# Patient Record
Sex: MALE | Race: WHITE | NOT HISPANIC OR LATINO | URBAN - METROPOLITAN AREA
[De-identification: names, ages, dates, MRNs, and addresses within clinical notes are randomized per-mention and may not be internally consistent; named-entity substitution may affect disease eponyms.]

---

## 2023-02-17 ENCOUNTER — HOSPITAL ENCOUNTER (INPATIENT)
Facility: HOSPITAL | Age: 73
LOS: 2 days | Discharge: HOME | DRG: 605 | End: 2023-02-21
Attending: EMERGENCY MEDICINE | Admitting: SURGERY
Payer: MEDICARE

## 2023-02-17 DIAGNOSIS — S81.811A LACERATION OF RIGHT LOWER EXTREMITY, INITIAL ENCOUNTER: Primary | ICD-10-CM

## 2023-02-17 PROCEDURE — 99285 PR EMERGENCY DEPT VISIT,LEVEL V: ICD-10-PCS | Mod: ,,, | Performed by: EMERGENCY MEDICINE

## 2023-02-17 PROCEDURE — 96374 THER/PROPH/DIAG INJ IV PUSH: CPT

## 2023-02-17 PROCEDURE — 99285 EMERGENCY DEPT VISIT HI MDM: CPT | Mod: 25

## 2023-02-17 PROCEDURE — 99285 EMERGENCY DEPT VISIT HI MDM: CPT | Mod: ,,, | Performed by: EMERGENCY MEDICINE

## 2023-02-17 RX ORDER — MORPHINE SULFATE 4 MG/ML
4 INJECTION, SOLUTION INTRAMUSCULAR; INTRAVENOUS
Status: COMPLETED | OUTPATIENT
Start: 2023-02-18 | End: 2023-02-17

## 2023-02-17 RX ADMIN — MORPHINE SULFATE 4 MG: 4 INJECTION INTRAVENOUS at 11:02

## 2023-02-18 PROBLEM — S71.112A LACERATION OF LEFT THIGH: Status: ACTIVE | Noted: 2023-02-18

## 2023-02-18 PROBLEM — S81.811A LACERATION OF RIGHT LOWER EXTREMITY: Status: ACTIVE | Noted: 2023-02-18

## 2023-02-18 PROBLEM — S71.111A LACERATION OF RIGHT THIGH: Status: ACTIVE | Noted: 2023-02-18

## 2023-02-18 LAB
ANION GAP SERPL CALC-SCNC: 9 MMOL/L (ref 8–16)
BASOPHILS # BLD AUTO: 0.03 K/UL (ref 0–0.2)
BASOPHILS NFR BLD: 0.5 % (ref 0–1.9)
BUN SERPL-MCNC: 16 MG/DL (ref 8–23)
CALCIUM SERPL-MCNC: 9 MG/DL (ref 8.7–10.5)
CHLORIDE SERPL-SCNC: 107 MMOL/L (ref 95–110)
CO2 SERPL-SCNC: 24 MMOL/L (ref 23–29)
CREAT SERPL-MCNC: 0.8 MG/DL (ref 0.5–1.4)
DIFFERENTIAL METHOD: ABNORMAL
EOSINOPHIL # BLD AUTO: 0.3 K/UL (ref 0–0.5)
EOSINOPHIL NFR BLD: 4 % (ref 0–8)
ERYTHROCYTE [DISTWIDTH] IN BLOOD BY AUTOMATED COUNT: 13.2 % (ref 11.5–14.5)
EST. GFR  (NO RACE VARIABLE): >60 ML/MIN/1.73 M^2
GLUCOSE SERPL-MCNC: 84 MG/DL (ref 70–110)
HCT VFR BLD AUTO: 41.1 % (ref 40–54)
HGB BLD-MCNC: 13 G/DL (ref 14–18)
IMM GRANULOCYTES # BLD AUTO: 0.01 K/UL (ref 0–0.04)
IMM GRANULOCYTES NFR BLD AUTO: 0.2 % (ref 0–0.5)
LYMPHOCYTES # BLD AUTO: 0.7 K/UL (ref 1–4.8)
LYMPHOCYTES NFR BLD: 11.2 % (ref 18–48)
MCH RBC QN AUTO: 28.6 PG (ref 27–31)
MCHC RBC AUTO-ENTMCNC: 31.6 G/DL (ref 32–36)
MCV RBC AUTO: 91 FL (ref 82–98)
MONOCYTES # BLD AUTO: 0.7 K/UL (ref 0.3–1)
MONOCYTES NFR BLD: 10.1 % (ref 4–15)
NEUTROPHILS # BLD AUTO: 4.8 K/UL (ref 1.8–7.7)
NEUTROPHILS NFR BLD: 74 % (ref 38–73)
NRBC BLD-RTO: 0 /100 WBC
PLATELET # BLD AUTO: 265 K/UL (ref 150–450)
PMV BLD AUTO: 9 FL (ref 9.2–12.9)
POTASSIUM SERPL-SCNC: 4.4 MMOL/L (ref 3.5–5.1)
RBC # BLD AUTO: 4.54 M/UL (ref 4.6–6.2)
SODIUM SERPL-SCNC: 140 MMOL/L (ref 136–145)
WBC # BLD AUTO: 6.45 K/UL (ref 3.9–12.7)

## 2023-02-18 PROCEDURE — 99223 1ST HOSP IP/OBS HIGH 75: CPT | Mod: ,,, | Performed by: ORTHOPAEDIC SURGERY

## 2023-02-18 PROCEDURE — 63600175 PHARM REV CODE 636 W HCPCS: Performed by: STUDENT IN AN ORGANIZED HEALTH CARE EDUCATION/TRAINING PROGRAM

## 2023-02-18 PROCEDURE — 25000003 PHARM REV CODE 250: Performed by: SURGERY

## 2023-02-18 PROCEDURE — 80048 BASIC METABOLIC PNL TOTAL CA: CPT | Performed by: STUDENT IN AN ORGANIZED HEALTH CARE EDUCATION/TRAINING PROGRAM

## 2023-02-18 PROCEDURE — 99223 PR INITIAL HOSPITAL CARE,LEVL III: ICD-10-PCS | Mod: ,,, | Performed by: ORTHOPAEDIC SURGERY

## 2023-02-18 PROCEDURE — 63600175 PHARM REV CODE 636 W HCPCS: Performed by: SURGERY

## 2023-02-18 PROCEDURE — 25000003 PHARM REV CODE 250: Performed by: STUDENT IN AN ORGANIZED HEALTH CARE EDUCATION/TRAINING PROGRAM

## 2023-02-18 PROCEDURE — 85025 COMPLETE CBC W/AUTO DIFF WBC: CPT | Performed by: STUDENT IN AN ORGANIZED HEALTH CARE EDUCATION/TRAINING PROGRAM

## 2023-02-18 PROCEDURE — G0378 HOSPITAL OBSERVATION PER HR: HCPCS

## 2023-02-18 PROCEDURE — 15851 PR REM SUTURES W ANESTH OTHR SURGEON: ICD-10-PCS | Mod: ,,, | Performed by: SURGERY

## 2023-02-18 PROCEDURE — 36415 COLL VENOUS BLD VENIPUNCTURE: CPT | Performed by: STUDENT IN AN ORGANIZED HEALTH CARE EDUCATION/TRAINING PROGRAM

## 2023-02-18 PROCEDURE — 96372 THER/PROPH/DIAG INJ SC/IM: CPT | Performed by: STUDENT IN AN ORGANIZED HEALTH CARE EDUCATION/TRAINING PROGRAM

## 2023-02-18 PROCEDURE — 63600175 PHARM REV CODE 636 W HCPCS

## 2023-02-18 PROCEDURE — 15851 REMOVAL SUTR/STAPLE REQ ANES: CPT | Mod: ,,, | Performed by: SURGERY

## 2023-02-18 PROCEDURE — 94761 N-INVAS EAR/PLS OXIMETRY MLT: CPT

## 2023-02-18 RX ORDER — ACETAMINOPHEN 325 MG/1
650 TABLET ORAL EVERY 8 HOURS PRN
Status: DISCONTINUED | OUTPATIENT
Start: 2023-02-18 | End: 2023-02-19

## 2023-02-18 RX ORDER — TALC
6 POWDER (GRAM) TOPICAL NIGHTLY PRN
Status: DISCONTINUED | OUTPATIENT
Start: 2023-02-18 | End: 2023-02-21 | Stop reason: HOSPADM

## 2023-02-18 RX ORDER — SODIUM CHLORIDE 0.9 % (FLUSH) 0.9 %
10 SYRINGE (ML) INJECTION
Status: DISCONTINUED | OUTPATIENT
Start: 2023-02-18 | End: 2023-02-21 | Stop reason: HOSPADM

## 2023-02-18 RX ORDER — HYDROMORPHONE HYDROCHLORIDE 1 MG/ML
0.2 INJECTION, SOLUTION INTRAMUSCULAR; INTRAVENOUS; SUBCUTANEOUS ONCE
Status: COMPLETED | OUTPATIENT
Start: 2023-02-18 | End: 2023-02-18

## 2023-02-18 RX ORDER — VANCOMYCIN HCL IN 5 % DEXTROSE 1.25 G/25
1250 PLASTIC BAG, INJECTION (ML) INTRAVENOUS
Status: DISCONTINUED | OUTPATIENT
Start: 2023-02-18 | End: 2023-02-19

## 2023-02-18 RX ORDER — ONDANSETRON 8 MG/1
8 TABLET, ORALLY DISINTEGRATING ORAL EVERY 8 HOURS PRN
Status: DISCONTINUED | OUTPATIENT
Start: 2023-02-18 | End: 2023-02-21 | Stop reason: HOSPADM

## 2023-02-18 RX ORDER — ENOXAPARIN SODIUM 100 MG/ML
40 INJECTION SUBCUTANEOUS EVERY 24 HOURS
Status: DISCONTINUED | OUTPATIENT
Start: 2023-02-18 | End: 2023-02-21 | Stop reason: HOSPADM

## 2023-02-18 RX ORDER — SODIUM CHLORIDE, SODIUM LACTATE, POTASSIUM CHLORIDE, CALCIUM CHLORIDE 600; 310; 30; 20 MG/100ML; MG/100ML; MG/100ML; MG/100ML
INJECTION, SOLUTION INTRAVENOUS CONTINUOUS
Status: DISCONTINUED | OUTPATIENT
Start: 2023-02-18 | End: 2023-02-18

## 2023-02-18 RX ADMIN — PIPERACILLIN SODIUM AND TAZOBACTAM SODIUM 4.5 G: 4; .5 INJECTION, POWDER, LYOPHILIZED, FOR SOLUTION INTRAVENOUS at 02:02

## 2023-02-18 RX ADMIN — PIPERACILLIN SODIUM AND TAZOBACTAM SODIUM 4.5 G: 4; .5 INJECTION, POWDER, LYOPHILIZED, FOR SOLUTION INTRAVENOUS at 09:02

## 2023-02-18 RX ADMIN — HYDROMORPHONE HYDROCHLORIDE 0.2 MG: 1 INJECTION, SOLUTION INTRAMUSCULAR; INTRAVENOUS; SUBCUTANEOUS at 11:02

## 2023-02-18 RX ADMIN — PIPERACILLIN SODIUM AND TAZOBACTAM SODIUM 4.5 G: 4; .5 INJECTION, POWDER, LYOPHILIZED, FOR SOLUTION INTRAVENOUS at 05:02

## 2023-02-18 RX ADMIN — ENOXAPARIN SODIUM 40 MG: 40 INJECTION SUBCUTANEOUS at 05:02

## 2023-02-18 RX ADMIN — VANCOMYCIN HYDROCHLORIDE 1250 MG: 5 INJECTION, POWDER, LYOPHILIZED, FOR SOLUTION INTRAVENOUS at 01:02

## 2023-02-18 RX ADMIN — VANCOMYCIN HYDROCHLORIDE 1500 MG: 1.5 INJECTION, POWDER, LYOPHILIZED, FOR SOLUTION INTRAVENOUS at 03:02

## 2023-02-18 RX ADMIN — SODIUM CHLORIDE, POTASSIUM CHLORIDE, SODIUM LACTATE AND CALCIUM CHLORIDE: 600; 310; 30; 20 INJECTION, SOLUTION INTRAVENOUS at 05:02

## 2023-02-18 NOTE — ED PROVIDER NOTES
"Encounter Date: 2/17/2023       History     Chief Complaint   Patient presents with    Transfer     From [facility] for surg eval of leg injury - pt cut leg with saw yesterday and attempted to suture/glue it himself     72-year-old male presenting is transfer from [place] for surgical evaluation of right lower extremity laceration that occurred over 24 hours ago.  Patient states that his shoulder "gave out" and he lost control of a circular saw which caused a deep 20 cm laceration along his right distal thigh.  Patient attempted to suture the laceration himself with a needle and thread as well as superglue. He reports some numbness for a few centimeters distal to the laceration but reports intact motor and sensation in the reminder of the lower leg. At OSH, he received Tdap and Ancef.  X-ray showed no fracture or osseous abnormality.  CTA of the thigh showed no contrast extravasation.  Patient reports pain is controlled at present.     The history is provided by medical records and the patient. No  was used.   Review of patient's allergies indicates:  No Known Allergies  History reviewed. No pertinent past medical history.  History reviewed. No pertinent surgical history.  History reviewed. No pertinent family history.  Social History     Tobacco Use    Smoking status: Unknown     Review of Systems   Constitutional:  Negative for chills and fever.   HENT:  Negative for congestion and sore throat.    Eyes:  Negative for pain and discharge.   Respiratory:  Negative for shortness of breath and wheezing.    Cardiovascular:  Negative for chest pain and palpitations.   Gastrointestinal:  Negative for abdominal pain, nausea and vomiting.   Genitourinary:  Negative for difficulty urinating and dysuria.   Musculoskeletal:  Negative for back pain and joint swelling.   Skin:  Positive for wound. Negative for color change and rash.   Neurological:  Positive for numbness (At distal right thigh). Negative " for weakness.   Hematological:  Does not bruise/bleed easily.     Physical Exam     Initial Vitals [02/17/23 2305]   BP Pulse Resp Temp SpO2   124/74 78 16 97.6 °F (36.4 °C) 97 %      MAP       --         Physical Exam    Nursing note and vitals reviewed.  Constitutional: He is not diaphoretic. No distress.   HENT:   Head: Normocephalic and atraumatic.   Mouth/Throat: Oropharynx is clear and moist.   Eyes: Conjunctivae and EOM are normal.   Neck: Neck supple.   Normal range of motion.  Cardiovascular:  Normal rate, regular rhythm, normal heart sounds and intact distal pulses.           Pulmonary/Chest: Breath sounds normal. He has no wheezes. He has no rhonchi. He has no rales.   Abdominal: Abdomen is soft. He exhibits no distension. There is no abdominal tenderness.   Musculoskeletal:         General: Normal range of motion.      Cervical back: Normal range of motion and neck supple.     Neurological: He is alert and oriented to person, place, and time. He has normal strength. A sensory deficit (Numbness for 3 cm distal to laceration) is present.   Skin: Skin is warm and dry. Capillary refill takes less than 2 seconds.   20cm semi-circumferential laceration to right anterior distal thigh with visible suture as well as surrounding erythema and induration. Normal ROM of knee and hip.  Palpable right DP pulse.             ED Course   Procedures  Labs Reviewed - No data to display         Imaging Results    None          Medications   sodium chloride 0.9% flush 10 mL (has no administration in time range)   ondansetron disintegrating tablet 8 mg (has no administration in time range)   melatonin tablet 6 mg (has no administration in time range)   acetaminophen tablet 650 mg (has no administration in time range)   vancomycin - pharmacy to dose (has no administration in time range)   piperacillin-tazobactam (ZOSYN) 4.5 g in dextrose 5 % in water (D5W) 5 % 100 mL IVPB (MB+) (4.5 g Intravenous New Bag 2/18/23 3488)    vancomycin (VANCOCIN) 1250 mg in 5 % dextrose 250 mL IVPB (has no administration in time range)   lactated ringers infusion ( Intravenous New Bag 2/18/23 0576)   enoxaparin injection 40 mg (has no administration in time range)   morphine injection 4 mg (4 mg Intravenous Given 2/17/23 3983)   vancomycin 1,500 mg in dextrose 5 % (D5W) 250 mL IVPB (Vial-Mate) (0 mg Intravenous Stopped 2/18/23 7627)     Medical Decision Making:   History:   Old Medical Records: I decided to obtain old medical records.  Old Records Summarized: records from another hospital.  Differential Diagnosis:   Including, but not limited to:  Laceration   Cellulitis  Abscess  Joint space infection  ED Management:  Patient is hemodynamically stable.  Morphine given for pain.  Orthopedic surgery consulted and determined based on the CT scan that there was no joint involvement.  General surgery consulted and admitted patient with plan to take him to the operating room for incision and debridement.  Other:   I have discussed this case with another health care provider.          Attending Attestation:   Physician Attestation Statement for Resident:  As the supervising MD   Physician Attestation Statement: I have personally seen and examined this patient.   I agree with the above history.  -: 72-year-old male transferred from outside hospital for evaluation of leg wound.  Tetanus updated at outside hospital, patient received Ancef.  Labs reviewed from outside hospital, as well as CT imaging (superficial fraying of the muscle fibers of the vastus lateralis group is suspected, quadriceps musculotendinous junction may also demonstrates some involvement with superficial fraying but grossly appears to be aligned/intact).   Denies other injury during the incident.   As the supervising MD I agree with the above PE.   -: Right lower extremity wound with surrounding erythema  Intact distal pulse, sensation intact to light touch (reports some decrease just  distal to wound), compartments soft  ROM right knee limited 2/2 pain   RRR  No distress  Lungs clear   No e/o head wound, non-tender spine, chest, abdomen    As the supervising MD I agree with the above treatment, course, plan, and disposition.   -: Appreciate General surgery evaluation of the patient.    Also discussed case with Orthopedics, no concern for joint involvement.  Patient admitted to general surgery service for further management, plan for likely OR washout.   Patient understands and agrees with plan.   Would trend CPK once admitted (abnormal at OSH).    I have reviewed and agree with the residents interpretation of the following: lab data.  I have reviewed the following: old records at this facility.                           Clinical Impression:   Final diagnoses:  [S8.392E] Laceration of right lower extremity, initial encounter (Primary)        ED Disposition Condition    Observation                 Indira Pereyra MD  Resident  02/18/23 0660       Devin Bullock MD  02/18/23 5180

## 2023-02-18 NOTE — SUBJECTIVE & OBJECTIVE
History reviewed. No pertinent past medical history.    History reviewed. No pertinent surgical history.    Review of patient's allergies indicates:  No Known Allergies    No current facility-administered medications for this encounter.     No current outpatient medications on file.     Family History    None       Tobacco Use    Smoking status: Unknown    Smokeless tobacco: Not on file   Substance and Sexual Activity    Alcohol use: Not on file    Drug use: Not on file    Sexual activity: Not on file     ROS  Constitutional: negative for fevers  Eyes: negative visual changes  ENT: negative for hearing loss  Respiratory: negative for dyspnea  Cardiovascular: negative for chest pain  Gastrointestinal: negative for abdominal pain  Genitourinary: negative for dysuria  Neurological: negative for headaches  Behavioral/Psych: negative for hallucinations  Endocrine: negative for temperature intolerance    Objective:     Vital Signs (Most Recent):  Temp: 97.6 °F (36.4 °C) (02/17/23 2305)  Pulse: 78 (02/17/23 2305)  Resp: 16 (02/17/23 2356)  BP: 124/74 (02/17/23 2305)  SpO2: 97 % (02/17/23 2305) Vital Signs (24h Range):  Temp:  [97.6 °F (36.4 °C)-99.4 °F (37.4 °C)] 97.6 °F (36.4 °C)  Pulse:  [78-96] 78  Resp:  [16-24] 16  SpO2:  [97 %-100 %] 97 %  BP: (124-179)/(63-93) 124/74           There is no height or weight on file to calculate BMI.    No intake or output data in the 24 hours ending 02/18/23 0052    Ortho/SPM Exam  General:  no acute distress, appears stated age   Neuro: alert and oriented x3  Psych: normal mood  Head: normocephalic, atraumatic.  Eyes: no scleral icterus  Mouth: moist mucous membranes  Cardiovascular: extremities warm and well perfused  Lungs: breathing comfortably, equal chest rise bilat  Skin: clean, dry, intact (any exceptions noted in below musculoskeletal exam)    MSK:  RUE:  - Skin intact throughout, no open wounds  - No swelling  - No ecchymosis, erythema, or signs of cellulitis  - NonTTP  throughout  - AROM and PROM of the shoulder, elbow, wrist, and hand intact without pain  - Axillary/AIN/PIN/Radial/Median/Ulnar Nerves assessed in isolation without deficit  - SILT throughout  - Compartments soft  - Radial artery palpated   - Capillary Refill <3s    LUE:  - Skin intact throughout, no open wounds  - No swelling  - No ecchymosis, erythema, or signs of cellulitis  - NonTTP throughout  - AROM and PROM of the shoulder, elbow, wrist, and hand intact without pain  - Axillary/AIN/PIN/Radial/Median/Ulnar Nerves assessed in isolation without deficit  - SILT throughout  - Compartments soft  - Radial artery palpated   - Capillary Refill <3s    RLE:  - Large laceration over distal femur with retained glue/thread  - Able to ambulate  - TTP over knee  - Pain with knee ROM is limited to quad pain, no knee joint pain  - TA/EHL/Gastroc/FHL assessed in isolation without deficit  - SILT except several cm area distal surrounding laceration  - Compartments soft  - DP and PT palpated  - Capillary Refill <3s      LLE:  - Skin intact throughout, no open wounds  - No swelling  - No ecchymosis, erythema, or signs of cellulitis  - NonTTP throughout  - AROM and PROM of the hip, knee, ankle, and foot intact without pain  - TA/EHL/Gastroc/FHL assessed in isolation without deficit  - SILT throughout  - Compartments soft  - DP and PT palpated  - Capillary Refill <3s      Spine/pelvis/axial body:  No tenderness to palpation of cervical, thoracic, or lumbar spine  No pain with compression of pelvis  No chest wall or abdominal tenderness        Significant Labs: All pertinent labs within the past 24 hours have been reviewed.    Significant Imaging: I have reviewed and interpreted all pertinent imaging results/findings.  CTA showing air within anterior soft tissues of right thigh, superficial to quad/vastus musculature, small amount of superficial muscle involvement. No air near knee joint, no joint effusion.

## 2023-02-18 NOTE — HPI
"72-year-old male presenting is transfer from [place] for surgical evaluation of right lower extremity laceration that occurred over 24 hours ago.  Patient states that his shoulder "gave out" and he lost control of a circular saw which caused a deep 20 cm laceration along his right distal thigh.  Patient attempted to suture the laceration himself with a needle and thread as well as superglue. He reports some numbness for a few centimeters distal to the laceration but reports intact motor and sensation in the reminder of the lower leg. At OSH, he received Tdap and Ancef.  X-ray showed no fracture or osseous abnormality.  CTA of the thigh showed no contrast extravasation.  Patient reports pain is controlled at present. He has been ambulatory.  "

## 2023-02-18 NOTE — ASSESSMENT & PLAN NOTE
72-year-old male presenting with 20 cm long right lower extremity laceration, majority superficial to quadriceps/vastus musculature. There is no involvement of the knee joint on CT scan, and the knee joint itself is painless on exam. Intact extensor mechanism. Agree with Tdap and ancef. No other orthopedic issues noted. Remainder of evaluation and management per general surgery.

## 2023-02-18 NOTE — CONSULTS
"[facility] - [facility] Dept  Orthopedics  Consult Note    Patient Name: Collins Ivy  MRN: [id number]  Admission Date: 2/17/2023  Hospital Length of Stay: 0 days  Attending Provider: Santiago Cuello MD  Primary Care Provider: Primary Doctor No      Inpatient consult to Orthopedic Surgery  Consult performed by: Nikolas Borrego MD  Consult ordered by: Devin Bullock MD        Subjective:     Principal Problem:Laceration of right thigh    Chief Complaint:   Chief Complaint   Patient presents with    Transfer     From [facility] for surg eval of leg injury - pt cut leg with saw yesterday and attempted to suture/glue it himself        HPI: 72-year-old male presenting is transfer from [place] for surgical evaluation of right lower extremity laceration that occurred over 24 hours ago.  Patient states that his shoulder "gave out" and he lost control of a circular saw which caused a deep 20 cm laceration along his right distal thigh.  Patient attempted to suture the laceration himself with a needle and thread as well as superglue. He reports some numbness for a few centimeters distal to the laceration but reports intact motor and sensation in the reminder of the lower leg. At OSH, he received Tdap and Ancef.  X-ray showed no fracture or osseous abnormality.  CTA of the thigh showed no contrast extravasation.  Patient reports pain is controlled at present. He has been ambulatory.      History reviewed. No pertinent past medical history.    History reviewed. No pertinent surgical history.    Review of patient's allergies indicates:  No Known Allergies    No current facility-administered medications for this encounter.     No current outpatient medications on file.     Family History    None       Tobacco Use    Smoking status: Unknown    Smokeless tobacco: Not on file   Substance and Sexual Activity    Alcohol use: Not on file    Drug use: Not on file    Sexual activity: Not on file     ROS  Constitutional: " negative for fevers  Eyes: negative visual changes  ENT: negative for hearing loss  Respiratory: negative for dyspnea  Cardiovascular: negative for chest pain  Gastrointestinal: negative for abdominal pain  Genitourinary: negative for dysuria  Neurological: negative for headaches  Behavioral/Psych: negative for hallucinations  Endocrine: negative for temperature intolerance    Objective:     Vital Signs (Most Recent):  Temp: 97.6 °F (36.4 °C) (02/17/23 2305)  Pulse: 78 (02/17/23 2305)  Resp: 16 (02/17/23 2356)  BP: 124/74 (02/17/23 2305)  SpO2: 97 % (02/17/23 2305) Vital Signs (24h Range):  Temp:  [97.6 °F (36.4 °C)-99.4 °F (37.4 °C)] 97.6 °F (36.4 °C)  Pulse:  [78-96] 78  Resp:  [16-24] 16  SpO2:  [97 %-100 %] 97 %  BP: (124-179)/(63-93) 124/74           There is no height or weight on file to calculate BMI.    No intake or output data in the 24 hours ending 02/18/23 0052    Ortho/SPM Exam  General:  no acute distress, appears stated age   Neuro: alert and oriented x3  Psych: normal mood  Head: normocephalic, atraumatic.  Eyes: no scleral icterus  Mouth: moist mucous membranes  Cardiovascular: extremities warm and well perfused  Lungs: breathing comfortably, equal chest rise bilat  Skin: clean, dry, intact (any exceptions noted in below musculoskeletal exam)    MSK:  RUE:  - Skin intact throughout, no open wounds  - No swelling  - No ecchymosis, erythema, or signs of cellulitis  - NonTTP throughout  - AROM and PROM of the shoulder, elbow, wrist, and hand intact without pain  - Axillary/AIN/PIN/Radial/Median/Ulnar Nerves assessed in isolation without deficit  - SILT throughout  - Compartments soft  - Radial artery palpated   - Capillary Refill <3s    LUE:  - Skin intact throughout, no open wounds  - No swelling  - No ecchymosis, erythema, or signs of cellulitis  - NonTTP throughout  - AROM and PROM of the shoulder, elbow, wrist, and hand intact without pain  - Axillary/AIN/PIN/Radial/Median/Ulnar Nerves assessed in  isolation without deficit  - SILT throughout  - Compartments soft  - Radial artery palpated   - Capillary Refill <3s    RLE:  - Large laceration over distal femur with retained glue/thread  - Able to ambulate  - TTP over knee  - Pain with knee ROM is limited to quad pain, no knee joint pain  - TA/EHL/Gastroc/FHL assessed in isolation without deficit  - SILT except several cm area distal surrounding laceration  - Compartments soft  - DP and PT palpated  - Capillary Refill <3s      LLE:  - Skin intact throughout, no open wounds  - No swelling  - No ecchymosis, erythema, or signs of cellulitis  - NonTTP throughout  - AROM and PROM of the hip, knee, ankle, and foot intact without pain  - TA/EHL/Gastroc/FHL assessed in isolation without deficit  - SILT throughout  - Compartments soft  - DP and PT palpated  - Capillary Refill <3s      Spine/pelvis/axial body:  No tenderness to palpation of cervical, thoracic, or lumbar spine  No pain with compression of pelvis  No chest wall or abdominal tenderness        Significant Labs: All pertinent labs within the past 24 hours have been reviewed.    Significant Imaging: I have reviewed and interpreted all pertinent imaging results/findings.  CTA showing air within anterior soft tissues of right thigh, superficial to quad/vastus musculature, small amount of superficial muscle involvement. No air near knee joint, no joint effusion.    Assessment/Plan:     * Laceration of right thigh  72-year-old male presenting with 20 cm long right lower extremity laceration, majority superficial to quadriceps/vastus musculature. There is no involvement of the knee joint on CT scan, and the knee joint itself is painless on exam. Intact extensor mechanism. Received Tdap and ancef, vanc ordered. No other orthopedic issues noted. Remainder of evaluation and management per general surgery.          Nikolas Borrego MD  Orthopedics  [facility] Dept

## 2023-02-18 NOTE — CONSULTS
[facility]  General Surgery  Consult Note    Inpatient consult to General Surgery  Consult performed by: Jb Stanley MD  Consult ordered by: Indira Pereyra MD      Subjective:     Chief Complaint/Reason for Admission:   Laceration     History of Present Illness:   71 yo male presenting with laceration to the right thigh that occurred yesterday. Reports that he was using a circular saw and accidentally cut himself. He stitched closed with polyester sutured from [employer] and then applied superglue to stop the bleeding. This afternoon he had increased pain and muscle spasm so he consulted to the ED.     At the ED a XR and a CTA were done. There was no evidence of active contrast extravasation or large focal hematoma within the soft tissues involved with a superficial laceration at the level of the distal 1/3rd of the diaphysis of the femur just above the knee midline extending laterally.  The Quadriceps musculotendinous junction may also demonstrates some involvement with superficial fraying but grossly appears to be aligned/intact as imaged.    General surgery and orthopedics were consulted for evaluation.      Current Facility-Administered Medications on File Prior to Encounter   Medication    [COMPLETED] ceFAZolin (ANCEF) 1 gram in dextrose 5 % 50 mL IVPB (premix)    [COMPLETED] iohexoL (OMNIPAQUE 350) injection 100 mL    [COMPLETED] morphine injection 4 mg    [COMPLETED] sodium chloride 0.9% bolus 1,000 mL 1,000 mL    [COMPLETED] Tdap (BOOSTRIX) vaccine injection 0.5 mL     No current outpatient medications on file prior to encounter.       Review of patient's allergies indicates:  No Known Allergies    History reviewed. No pertinent past medical history.  History reviewed. No pertinent surgical history.  Family History    None       Tobacco Use    Smoking status: Unknown    Smokeless tobacco: Not on file   Substance and Sexual Activity    Alcohol use: Not on file    Drug use: Not on file     Sexual activity: Not on file     Review of Systems   Constitutional:  Negative for activity change and appetite change.   HENT:  Negative for congestion and dental problem.    Cardiovascular:  Negative for chest pain and leg swelling.   Gastrointestinal:  Negative for abdominal distention and abdominal pain.   Objective:     Vital Signs (Most Recent):  Temp: 97.6 °F (36.4 °C) (02/17/23 2305)  Pulse: 80 (02/18/23 0030)  Resp: 14 (02/18/23 0030)  BP: (!) 169/81 (02/18/23 0030)  SpO2: 97 % (02/18/23 0030)   Vital Signs (24h Range):  Temp:  [97.6 °F (36.4 °C)-99.4 °F (37.4 °C)] 97.6 °F (36.4 °C)  Pulse:  [78-96] 80  Resp:  [14-24] 14  SpO2:  [97 %-100 %] 97 %  BP: (124-179)/(63-93) 169/81     Weight: 74.8 kg (165 lb)  Body mass index is 23.01 kg/m².    No intake or output data in the 24 hours ending 02/18/23 0058    Physical Exam  Constitutional:       Appearance: Normal appearance.   Cardiovascular:      Rate and Rhythm: Normal rate and regular rhythm.   Pulmonary:      Effort: Pulmonary effort is normal. No respiratory distress.   Abdominal:      General: Abdomen is flat. There is no distension.      Palpations: Abdomen is soft.      Tenderness: There is no abdominal tenderness. There is no guarding.   Skin:     Capillary Refill: Capillary refill takes less than 2 seconds.      Comments: 20 cm laceration superior to the R knee  Able to flex the knee   Painful to touch   No erythema  No hematoma  Sutures in place  Superglue present    Neurological:      General: No focal deficit present.      Mental Status: He is alert.        Media Information  File Link    Scan on 2/17/2023  4:32 PM by Jacqueline Bright RN        Bone Information    Document ID File Type Document Type Description   [id number] Image Photographs      Import Information    Attached At Date Time User Dept   Encounter Level 2/17/2023  4:32 PM Jacqueline Bright RN [facility] Emergency Department     Encounter    Hospital Encounter on 2/17/23          Significant Labs:  BMP:   Recent Labs   Lab 02/17/23  1703         K 3.7      CO2 26   BUN 22   CREATININE 0.9   CALCIUM 9.4     CBC:   Recent Labs   Lab 02/17/23  1703   WBC 9.73   RBC 4.84   HGB 14.2   HCT 42.9      MCV 89   MCH 29.3   MCHC 33.1       Significant Diagnostics:  I have reviewed all pertinent imaging results/findings within the past 24 hours.    Assessment/Plan:     71 yo male presenting with laceration to the right thigh that occurred yesterday. Reports that he was using a circular saw and accidentally cut himself. He stitched closed with polyester sutured from [employer] and then applied superglue to stop the bleeding.     - Broad spectrum antibiotics   - NPO   - Discussed with orthopedic surgery, no knee involvement.   - Knee's extensor mechanism is intact  -Wound opened at bedside.  Allowing for drainage.  If worsens, may need operative intervention.     Thank you for your consult. I will follow-up with patient. Please contact us if you have any additional questions.    Jb Stanley MD  General Surgery  [facility]

## 2023-02-18 NOTE — ED TRIAGE NOTES
Collins Ivy, an 72 y.o. male presents to the ED via EMS. Pt is a transfer from [place] for surgery consult after injuring his R leg with a circular saw. Bleeding controlled.      Chief Complaint   Patient presents with    Transfer     From [facility] for surg eval of leg injury - pt cut leg with saw yesterday and attempted to suture/glue it himself     Review of patient's allergies indicates:  No Known Allergies  No past medical history on file.

## 2023-02-18 NOTE — PLAN OF CARE
Pt resting in bed comfortably. PIV lines intact and free of infection and irritation. Fall precautions maintained, no falls noted. Call light within reach, bed locked and in lowest position. Non-skid socks on while out of bed. Patient instructed to call for assistance. Skin integrity maintained as patient is independent with frequent repositioning. C/o intermittent pain, managed with PRN meds, no other complaints or concerns. Progressing towards goals. Will continue to monitor and follow plan of care.

## 2023-02-18 NOTE — PROGRESS NOTES
Pharmacokinetic Initial Assessment: IV Vancomycin    Assessment/Plan:    Initiate intravenous vancomycin with loading dose of 1500 mg once followed by a maintenance dose of vancomycin 1250 mg IV every 12 hours  Desired empiric serum trough concentration is 10 to 20 mcg/mL  Draw vancomycin trough level 60 min prior to fourth dose on 2/19 at approximately 1300  Pharmacy will continue to follow and monitor vancomycin.      Please contact pharmacy at extension [phone] with any questions regarding this assessment.     Thank you for the consult,   Fabiola Machuca       Patient brief summary:  Collins Ivy is a 72 y.o. male initiated on antimicrobial therapy with IV Vancomycin for treatment of suspected skin & soft tissue infection    Drug Allergies:   Review of patient's allergies indicates:  No Known Allergies    Actual Body Weight:   74.8 kg    Renal Function:   Estimated Creatinine Clearance: 79.6 mL/min (based on SCr of 0.9 mg/dL).    Dialysis Method (if applicable):  N/A    CBC (last 72 hours):  Recent Labs   Lab Result Units 02/17/23  1703   WBC K/uL 9.73   Hemoglobin g/dL 14.2   Hematocrit % 42.9   Platelets K/uL 305   Gran % % 86.6*   Lymph % % 5.5*   Mono % % 5.7   Eosinophil % % 1.6   Basophil % % 0.4   Differential Method  Automated       Metabolic Panel (last 72 hours):  Recent Labs   Lab Result Units 02/17/23  1703   Sodium mmol/L 140   Potassium mmol/L 3.7   Chloride mmol/L 105   CO2 mmol/L 26   Glucose mg/dL 108   BUN mg/dL 22   Creatinine mg/dL 0.9   Albumin g/dL 4.0   Total Bilirubin mg/dL 0.5   Alkaline Phosphatase U/L 60   AST U/L 24   ALT U/L 32       Drug levels (last 3 results):  No results for input(s): VANCOMYCINRA, VANCORANDOM, VANCOMYCINPE, VANCOPEAK, VANCOMYCINTR, VANCOTROUGH in the last 72 hours.    Microbiologic Results:  Microbiology Results (last 7 days)       ** No results found for the last 168 hours. **

## 2023-02-19 LAB
BASOPHILS # BLD AUTO: 0.03 K/UL (ref 0–0.2)
BASOPHILS NFR BLD: 0.4 % (ref 0–1.9)
DIFFERENTIAL METHOD: ABNORMAL
EOSINOPHIL # BLD AUTO: 0.2 K/UL (ref 0–0.5)
EOSINOPHIL NFR BLD: 3.2 % (ref 0–8)
ERYTHROCYTE [DISTWIDTH] IN BLOOD BY AUTOMATED COUNT: 12.8 % (ref 11.5–14.5)
HCT VFR BLD AUTO: 40.3 % (ref 40–54)
HGB BLD-MCNC: 12.8 G/DL (ref 14–18)
IMM GRANULOCYTES # BLD AUTO: 0.02 K/UL (ref 0–0.04)
IMM GRANULOCYTES NFR BLD AUTO: 0.3 % (ref 0–0.5)
LYMPHOCYTES # BLD AUTO: 0.8 K/UL (ref 1–4.8)
LYMPHOCYTES NFR BLD: 11.5 % (ref 18–48)
MCH RBC QN AUTO: 28.7 PG (ref 27–31)
MCHC RBC AUTO-ENTMCNC: 31.8 G/DL (ref 32–36)
MCV RBC AUTO: 90 FL (ref 82–98)
MONOCYTES # BLD AUTO: 0.7 K/UL (ref 0.3–1)
MONOCYTES NFR BLD: 9.9 % (ref 4–15)
NEUTROPHILS # BLD AUTO: 5.1 K/UL (ref 1.8–7.7)
NEUTROPHILS NFR BLD: 74.7 % (ref 38–73)
NRBC BLD-RTO: 0 /100 WBC
PLATELET # BLD AUTO: 251 K/UL (ref 150–450)
PMV BLD AUTO: 8.8 FL (ref 9.2–12.9)
RBC # BLD AUTO: 4.46 M/UL (ref 4.6–6.2)
VANCOMYCIN TROUGH SERPL-MCNC: 8.1 UG/ML (ref 10–22)
WBC # BLD AUTO: 6.78 K/UL (ref 3.9–12.7)

## 2023-02-19 PROCEDURE — 99232 PR SUBSEQUENT HOSPITAL CARE,LEVL II: ICD-10-PCS | Mod: ,,, | Performed by: SURGERY

## 2023-02-19 PROCEDURE — 99232 SBSQ HOSP IP/OBS MODERATE 35: CPT | Mod: ,,, | Performed by: SURGERY

## 2023-02-19 PROCEDURE — 80202 ASSAY OF VANCOMYCIN: CPT | Performed by: SURGERY

## 2023-02-19 PROCEDURE — 63600175 PHARM REV CODE 636 W HCPCS: Performed by: STUDENT IN AN ORGANIZED HEALTH CARE EDUCATION/TRAINING PROGRAM

## 2023-02-19 PROCEDURE — 25000003 PHARM REV CODE 250

## 2023-02-19 PROCEDURE — 85025 COMPLETE CBC W/AUTO DIFF WBC: CPT | Performed by: STUDENT IN AN ORGANIZED HEALTH CARE EDUCATION/TRAINING PROGRAM

## 2023-02-19 PROCEDURE — 36415 COLL VENOUS BLD VENIPUNCTURE: CPT | Performed by: SURGERY

## 2023-02-19 PROCEDURE — 63600175 PHARM REV CODE 636 W HCPCS: Performed by: SURGERY

## 2023-02-19 PROCEDURE — 25000003 PHARM REV CODE 250: Performed by: SURGERY

## 2023-02-19 PROCEDURE — 25000003 PHARM REV CODE 250: Performed by: STUDENT IN AN ORGANIZED HEALTH CARE EDUCATION/TRAINING PROGRAM

## 2023-02-19 PROCEDURE — 11000001 HC ACUTE MED/SURG PRIVATE ROOM

## 2023-02-19 PROCEDURE — 36415 COLL VENOUS BLD VENIPUNCTURE: CPT | Performed by: STUDENT IN AN ORGANIZED HEALTH CARE EDUCATION/TRAINING PROGRAM

## 2023-02-19 RX ORDER — OXYCODONE HYDROCHLORIDE 5 MG/1
5 TABLET ORAL EVERY 6 HOURS PRN
Status: DISCONTINUED | OUTPATIENT
Start: 2023-02-19 | End: 2023-02-21 | Stop reason: HOSPADM

## 2023-02-19 RX ORDER — METHOCARBAMOL 500 MG/1
500 TABLET, FILM COATED ORAL 4 TIMES DAILY
Status: DISCONTINUED | OUTPATIENT
Start: 2023-02-19 | End: 2023-02-21 | Stop reason: HOSPADM

## 2023-02-19 RX ORDER — ACETAMINOPHEN 325 MG/1
650 TABLET ORAL EVERY 8 HOURS
Status: DISCONTINUED | OUTPATIENT
Start: 2023-02-19 | End: 2023-02-21 | Stop reason: HOSPADM

## 2023-02-19 RX ORDER — GABAPENTIN 300 MG/1
300 CAPSULE ORAL 3 TIMES DAILY
Status: DISCONTINUED | OUTPATIENT
Start: 2023-02-19 | End: 2023-02-21 | Stop reason: HOSPADM

## 2023-02-19 RX ADMIN — PIPERACILLIN SODIUM AND TAZOBACTAM SODIUM 4.5 G: 4; .5 INJECTION, POWDER, LYOPHILIZED, FOR SOLUTION INTRAVENOUS at 09:02

## 2023-02-19 RX ADMIN — METHOCARBAMOL 500 MG: 500 TABLET ORAL at 09:02

## 2023-02-19 RX ADMIN — GABAPENTIN 300 MG: 300 CAPSULE ORAL at 09:02

## 2023-02-19 RX ADMIN — ACETAMINOPHEN 650 MG: 325 TABLET ORAL at 02:02

## 2023-02-19 RX ADMIN — OXYCODONE 5 MG: 5 TABLET ORAL at 02:02

## 2023-02-19 RX ADMIN — PIPERACILLIN SODIUM AND TAZOBACTAM SODIUM 4.5 G: 4; .5 INJECTION, POWDER, LYOPHILIZED, FOR SOLUTION INTRAVENOUS at 05:02

## 2023-02-19 RX ADMIN — GABAPENTIN 300 MG: 300 CAPSULE ORAL at 02:02

## 2023-02-19 RX ADMIN — GABAPENTIN 300 MG: 300 CAPSULE ORAL at 08:02

## 2023-02-19 RX ADMIN — ENOXAPARIN SODIUM 40 MG: 40 INJECTION SUBCUTANEOUS at 05:02

## 2023-02-19 RX ADMIN — METHOCARBAMOL 500 MG: 500 TABLET ORAL at 12:02

## 2023-02-19 RX ADMIN — VANCOMYCIN HYDROCHLORIDE 1250 MG: 5 INJECTION, POWDER, LYOPHILIZED, FOR SOLUTION INTRAVENOUS at 02:02

## 2023-02-19 RX ADMIN — METHOCARBAMOL 500 MG: 500 TABLET ORAL at 08:02

## 2023-02-19 RX ADMIN — ACETAMINOPHEN 650 MG: 325 TABLET ORAL at 10:02

## 2023-02-19 RX ADMIN — METHOCARBAMOL 500 MG: 500 TABLET ORAL at 05:02

## 2023-02-19 RX ADMIN — PIPERACILLIN SODIUM AND TAZOBACTAM SODIUM 4.5 G: 4; .5 INJECTION, POWDER, LYOPHILIZED, FOR SOLUTION INTRAVENOUS at 01:02

## 2023-02-19 NOTE — PLAN OF CARE
Pt resting in bed comfortably. PIV line intact and infusing, free of infection and irritation. Fall precautions maintained, no falls noted. Call light within reach, bed locked and in lowest position. Non-skid socks on while out of bed. Patient instructed to call for assistance. Skin integrity maintained as patient is independent with frequent repositioning. C/o intermittent pain to wound site, managed with PRN and scheduled meds, no other complaints or concerns. Progressing towards goals. Will continue to monitor and follow plan of care.

## 2023-02-19 NOTE — SUBJECTIVE & OBJECTIVE
Interval History: NAEO. VSS. AF. Patient s/p wound washout and debridement yesterday. Some cellulitis noted around laceration site. Pain is controlled on oral medication, will add multimodals today. WBC normal.     Medications:  Continuous Infusions:  Scheduled Meds:   enoxaparin  40 mg Subcutaneous Daily    piperacillin-tazobactam (ZOSYN) IVPB  4.5 g Intravenous Q8H    vancomycin (VANCOCIN) IVPB  1,250 mg Intravenous Q12H     PRN Meds:acetaminophen, melatonin, ondansetron, oxyCODONE, sodium chloride 0.9%, Pharmacy to dose Vancomycin consult **AND** vancomycin - pharmacy to dose     Review of patient's allergies indicates:  No Known Allergies  Objective:     Vital Signs (Most Recent):  Temp: 98.3 °F (36.8 °C) (02/19/23 0452)  Pulse: 72 (02/19/23 0452)  Resp: 16 (02/19/23 0452)  BP: (!) 147/71 (02/19/23 0452)  SpO2: 97 % (02/19/23 0452)   Vital Signs (24h Range):  Temp:  [97.3 °F (36.3 °C)-98.9 °F (37.2 °C)] 98.3 °F (36.8 °C)  Pulse:  [72-86] 72  Resp:  [16-18] 16  SpO2:  [93 %-97 %] 97 %  BP: (131-161)/(66-75) 147/71     Weight: 74.8 kg (165 lb)  Body mass index is 23.01 kg/m².    Intake/Output - Last 3 Shifts         02/17 0700  02/18 0659 02/18 0700  02/19 0659 02/19 0700  02/20 0659    P.O.  860     Total Intake(mL/kg)  860 (11.5)     Urine (mL/kg/hr)  3050 (1.7)     Total Output  3050     Net  -2190                    Physical Exam  Eyes:      Pupils: Pupils are equal, round, and reactive to light.   Cardiovascular:      Rate and Rhythm: Normal rate.   Pulmonary:      Effort: Pulmonary effort is normal.   Abdominal:      General: Abdomen is flat.      Palpations: Abdomen is soft.   Musculoskeletal:      Comments: Right thigh laceration with erythema and swelling    Skin:     General: Skin is warm.   Neurological:      General: No focal deficit present.      Mental Status: He is alert.       Significant Labs:  I have reviewed all pertinent lab results within the past 24 hours.  CBC:   Recent Labs   Lab  02/19/23  0626   WBC 6.78   RBC 4.46*   HGB 12.8*   HCT 40.3      MCV 90   MCH 28.7   MCHC 31.8*     BMP:   Recent Labs   Lab 02/18/23  0506   GLU 84      K 4.4      CO2 24   BUN 16   CREATININE 0.8   CALCIUM 9.0       Significant Diagnostics:  I have reviewed all pertinent imaging results/findings within the past 24 hours.

## 2023-02-19 NOTE — PROGRESS NOTES
[facility]  General Surgery  Progress Note    Subjective:     History of Present Illness:  No notes on file    Post-Op Info:  Procedure(s) (LRB):  Incision and Drainage (Right)   1 Day Post-Op     Interval History: NAEO. VSS. AF. Patient s/p wound washout and debridement yesterday. Some cellulitis noted around laceration site. Pain is controlled on oral medication, will add multimodals today. WBC normal.     Medications:  Continuous Infusions:  Scheduled Meds:   enoxaparin  40 mg Subcutaneous Daily    piperacillin-tazobactam (ZOSYN) IVPB  4.5 g Intravenous Q8H    vancomycin (VANCOCIN) IVPB  1,250 mg Intravenous Q12H     PRN Meds:acetaminophen, melatonin, ondansetron, oxyCODONE, sodium chloride 0.9%, Pharmacy to dose Vancomycin consult **AND** vancomycin - pharmacy to dose     Review of patient's allergies indicates:  No Known Allergies  Objective:     Vital Signs (Most Recent):  Temp: 98.3 °F (36.8 °C) (02/19/23 0452)  Pulse: 72 (02/19/23 0452)  Resp: 16 (02/19/23 0452)  BP: (!) 147/71 (02/19/23 0452)  SpO2: 97 % (02/19/23 0452)   Vital Signs (24h Range):  Temp:  [97.3 °F (36.3 °C)-98.9 °F (37.2 °C)] 98.3 °F (36.8 °C)  Pulse:  [72-86] 72  Resp:  [16-18] 16  SpO2:  [93 %-97 %] 97 %  BP: (131-161)/(66-75) 147/71     Weight: 74.8 kg (165 lb)  Body mass index is 23.01 kg/m².    Intake/Output - Last 3 Shifts         02/17 0700  02/18 0659 02/18 0700  02/19 0659 02/19 0700  02/20 0659    P.O.  860     Total Intake(mL/kg)  860 (11.5)     Urine (mL/kg/hr)  3050 (1.7)     Total Output  3050     Net  -2190                    Physical Exam  Eyes:      Pupils: Pupils are equal, round, and reactive to light.   Cardiovascular:      Rate and Rhythm: Normal rate.   Pulmonary:      Effort: Pulmonary effort is normal.   Abdominal:      General: Abdomen is flat.      Palpations: Abdomen is soft.   Musculoskeletal:      Comments: Right thigh laceration with erythema and swelling    Skin:     General: Skin is warm.    Neurological:      General: No focal deficit present.      Mental Status: He is alert.       Significant Labs:  I have reviewed all pertinent lab results within the past 24 hours.  CBC:   Recent Labs   Lab 02/19/23  0626   WBC 6.78   RBC 4.46*   HGB 12.8*   HCT 40.3      MCV 90   MCH 28.7   MCHC 31.8*     BMP:   Recent Labs   Lab 02/18/23  0506   GLU 84      K 4.4      CO2 24   BUN 16   CREATININE 0.8   CALCIUM 9.0       Significant Diagnostics:  I have reviewed all pertinent imaging results/findings within the past 24 hours.    Assessment/Plan:     * Laceration of right thigh  Collins Ivy is a 72 y.o. presenting with laceration to the right thigh. Reports that he was using a circular saw and accidentally cut himself. He stitched closed with polyester sutured from [place] and then applied superglue to stop the bleeding. Now s/p washout on 2/18/2023    - continue IV abx for another 24 hrs   - will clean wound site today at bedside   - PRN pain management with MM   - FUB CBC    Neetu Palmer MD  General Surgery  [facility] - Surgery

## 2023-02-19 NOTE — ASSESSMENT & PLAN NOTE
Collins Ivy is a 72 y.o. presenting with laceration to the right thigh. Reports that he was using a circular saw and accidentally cut himself. He stitched closed with polyester sutured from [place] and then applied superglue to stop the bleeding. Now s/p washout on 2/18/2023    - continue IV abx for another 24 hrs   - will clean wound site today at bedside   - PRN pain management with MM

## 2023-02-19 NOTE — PROGRESS NOTES
Pharmacokinetic Assessment Follow Up: IV Vancomycin    Vancomycin serum concentration assessment(s):    The trough level was drawn correctly and can be used to guide therapy at this time. The measurement is below the desired definitive target range of 10 to 20 mcg/mL.    Vancomycin Regimen Plan:    Change regimen to Vancomycin 1500 mg IV every 12 hours with next serum trough concentration measured at 1300 prior to 4th dose on 2/21    Drug levels (last 3 results):  Recent Labs   Lab Result Units 02/19/23  1313   Vancomycin-Trough ug/mL 8.1*       Pharmacy will continue to follow and monitor vancomycin.    Please contact pharmacy at extension [phone] for questions regarding this assessment.    Thank you for the consult,   Mirian Ricci       Patient brief summary:  Collins Ivy is a 72 y.o. male initiated on antimicrobial therapy with IV Vancomycin for treatment of skin & soft tissue infection        Drug Allergies:   Review of patient's allergies indicates:  No Known Allergies    Actual Body Weight:   74.8 kg    Renal Function:   Estimated Creatinine Clearance: 89.6 mL/min (based on SCr of 0.8 mg/dL).,     Dialysis Method (if applicable):  N/A    CBC (last 72 hours):  Recent Labs   Lab Result Units 02/17/23  1703 02/18/23  0506 02/19/23  0626   WBC K/uL 9.73 6.45 6.78   Hemoglobin g/dL 14.2 13.0* 12.8*   Hematocrit % 42.9 41.1 40.3   Platelets K/uL 305 265 251   Gran % % 86.6* 74.0* 74.7*   Lymph % % 5.5* 11.2* 11.5*   Mono % % 5.7 10.1 9.9   Eosinophil % % 1.6 4.0 3.2   Basophil % % 0.4 0.5 0.4   Differential Method  Automated Automated Automated       Metabolic Panel (last 72 hours):  Recent Labs   Lab Result Units 02/17/23  1703 02/18/23  0506   Sodium mmol/L 140 140   Potassium mmol/L 3.7 4.4   Chloride mmol/L 105 107   CO2 mmol/L 26 24   Glucose mg/dL 108 84   BUN mg/dL 22 16   Creatinine mg/dL 0.9 0.8   Albumin g/dL 4.0  --    Total Bilirubin mg/dL 0.5  --    Alkaline Phosphatase U/L 60  --    AST U/L 24  --     ALT U/L 32  --        Vancomycin Administrations:  vancomycin given in the last 96 hours                     vancomycin (VANCOCIN) 1250 mg in 5 % dextrose 250 mL IVPB (mg) 1,250 mg New Bag 02/19/23 1403     1,250 mg New Bag  0225     1,250 mg New Bag 02/18/23 1351    vancomycin 1,500 mg in dextrose 5 % (D5W) 250 mL IVPB (Vial-Mate) (mg) 1,500 mg New Bag 02/18/23 0352                    Microbiologic Results:  Microbiology Results (last 7 days)       ** No results found for the last 168 hours. **

## 2023-02-20 LAB
BASOPHILS # BLD AUTO: 0.04 K/UL (ref 0–0.2)
BASOPHILS NFR BLD: 0.7 % (ref 0–1.9)
DIFFERENTIAL METHOD: ABNORMAL
EOSINOPHIL # BLD AUTO: 0.3 K/UL (ref 0–0.5)
EOSINOPHIL NFR BLD: 4.6 % (ref 0–8)
ERYTHROCYTE [DISTWIDTH] IN BLOOD BY AUTOMATED COUNT: 12.6 % (ref 11.5–14.5)
HCT VFR BLD AUTO: 43.3 % (ref 40–54)
HGB BLD-MCNC: 13.8 G/DL (ref 14–18)
IMM GRANULOCYTES # BLD AUTO: 0.02 K/UL (ref 0–0.04)
IMM GRANULOCYTES NFR BLD AUTO: 0.4 % (ref 0–0.5)
LYMPHOCYTES # BLD AUTO: 0.7 K/UL (ref 1–4.8)
LYMPHOCYTES NFR BLD: 12.6 % (ref 18–48)
MCH RBC QN AUTO: 29.1 PG (ref 27–31)
MCHC RBC AUTO-ENTMCNC: 31.9 G/DL (ref 32–36)
MCV RBC AUTO: 91 FL (ref 82–98)
MONOCYTES # BLD AUTO: 0.6 K/UL (ref 0.3–1)
MONOCYTES NFR BLD: 10.5 % (ref 4–15)
NEUTROPHILS # BLD AUTO: 4.1 K/UL (ref 1.8–7.7)
NEUTROPHILS NFR BLD: 71.2 % (ref 38–73)
NRBC BLD-RTO: 0 /100 WBC
PLATELET # BLD AUTO: 279 K/UL (ref 150–450)
PMV BLD AUTO: 9.1 FL (ref 9.2–12.9)
RBC # BLD AUTO: 4.75 M/UL (ref 4.6–6.2)
WBC # BLD AUTO: 5.7 K/UL (ref 3.9–12.7)

## 2023-02-20 PROCEDURE — 25000003 PHARM REV CODE 250: Performed by: STUDENT IN AN ORGANIZED HEALTH CARE EDUCATION/TRAINING PROGRAM

## 2023-02-20 PROCEDURE — 25000003 PHARM REV CODE 250: Performed by: SURGERY

## 2023-02-20 PROCEDURE — 25000003 PHARM REV CODE 250

## 2023-02-20 PROCEDURE — 94761 N-INVAS EAR/PLS OXIMETRY MLT: CPT

## 2023-02-20 PROCEDURE — 36415 COLL VENOUS BLD VENIPUNCTURE: CPT | Performed by: STUDENT IN AN ORGANIZED HEALTH CARE EDUCATION/TRAINING PROGRAM

## 2023-02-20 PROCEDURE — 99233 SBSQ HOSP IP/OBS HIGH 50: CPT | Mod: ,,, | Performed by: STUDENT IN AN ORGANIZED HEALTH CARE EDUCATION/TRAINING PROGRAM

## 2023-02-20 PROCEDURE — 63600175 PHARM REV CODE 636 W HCPCS: Performed by: SURGERY

## 2023-02-20 PROCEDURE — 99233 PR SUBSEQUENT HOSPITAL CARE,LEVL III: ICD-10-PCS | Mod: ,,, | Performed by: STUDENT IN AN ORGANIZED HEALTH CARE EDUCATION/TRAINING PROGRAM

## 2023-02-20 PROCEDURE — 11000001 HC ACUTE MED/SURG PRIVATE ROOM

## 2023-02-20 PROCEDURE — 63600175 PHARM REV CODE 636 W HCPCS: Performed by: STUDENT IN AN ORGANIZED HEALTH CARE EDUCATION/TRAINING PROGRAM

## 2023-02-20 PROCEDURE — 85025 COMPLETE CBC W/AUTO DIFF WBC: CPT | Performed by: STUDENT IN AN ORGANIZED HEALTH CARE EDUCATION/TRAINING PROGRAM

## 2023-02-20 RX ORDER — AMOXICILLIN AND CLAVULANATE POTASSIUM 875; 125 MG/1; MG/1
1 TABLET, FILM COATED ORAL EVERY 12 HOURS
Status: DISCONTINUED | OUTPATIENT
Start: 2023-02-20 | End: 2023-02-21 | Stop reason: HOSPADM

## 2023-02-20 RX ADMIN — METHOCARBAMOL 500 MG: 500 TABLET ORAL at 08:02

## 2023-02-20 RX ADMIN — VANCOMYCIN HYDROCHLORIDE 1500 MG: 1.5 INJECTION, POWDER, LYOPHILIZED, FOR SOLUTION INTRAVENOUS at 02:02

## 2023-02-20 RX ADMIN — OXYCODONE 5 MG: 5 TABLET ORAL at 09:02

## 2023-02-20 RX ADMIN — AMOXICILLIN AND CLAVULANATE POTASSIUM 1 TABLET: 875; 125 TABLET, FILM COATED ORAL at 10:02

## 2023-02-20 RX ADMIN — PIPERACILLIN SODIUM AND TAZOBACTAM SODIUM 4.5 G: 4; .5 INJECTION, POWDER, LYOPHILIZED, FOR SOLUTION INTRAVENOUS at 02:02

## 2023-02-20 RX ADMIN — METHOCARBAMOL 500 MG: 500 TABLET ORAL at 09:02

## 2023-02-20 RX ADMIN — ACETAMINOPHEN 650 MG: 325 TABLET ORAL at 10:02

## 2023-02-20 RX ADMIN — GABAPENTIN 300 MG: 300 CAPSULE ORAL at 10:02

## 2023-02-20 RX ADMIN — OXYCODONE 5 MG: 5 TABLET ORAL at 08:02

## 2023-02-20 RX ADMIN — OXYCODONE 5 MG: 5 TABLET ORAL at 10:02

## 2023-02-20 RX ADMIN — AMOXICILLIN AND CLAVULANATE POTASSIUM 1 TABLET: 875; 125 TABLET, FILM COATED ORAL at 08:02

## 2023-02-20 RX ADMIN — ACETAMINOPHEN 650 MG: 325 TABLET ORAL at 03:02

## 2023-02-20 RX ADMIN — GABAPENTIN 300 MG: 300 CAPSULE ORAL at 08:02

## 2023-02-20 RX ADMIN — METHOCARBAMOL 500 MG: 500 TABLET ORAL at 03:02

## 2023-02-20 RX ADMIN — GABAPENTIN 300 MG: 300 CAPSULE ORAL at 03:02

## 2023-02-20 RX ADMIN — ACETAMINOPHEN 650 MG: 325 TABLET ORAL at 06:02

## 2023-02-20 NOTE — PLAN OF CARE
[facility]  Initial Discharge Assessment       Primary Care Provider: Primary Doctor No    Admission Diagnosis: Laceration of right lower extremity, initial encounter [H65.968X]    Admission Date: 2/17/2023  Expected Discharge Date: 2/21/2023         Payor: MEDICARE / Plan: MEDICARE A ONLY / Product Type: Government /     Extended Emergency Contact Information  Primary Emergency Contact: negrocl davise  Mobile Phone: [phone]  Relation: Friend  Preferred language: English   needed? No    Discharge Plan A: Home  Discharge Plan B: Home      [facility] - [place], [place] - [address]  [address]  [place] [address]  Phone: [phone] Fax: [phone]      Initial Assessment (most recent)       Adult Discharge Assessment - 02/20/23 1128          Discharge Assessment    Assessment Type Discharge Planning Assessment     Confirmed/corrected address, phone number and insurance Yes     Confirmed Demographics Correct on Facesheet     Source of Information patient     Does patient/caregiver understand observation status Yes     Communicated LAMIN with patient/caregiver Yes     People in Home alone     Do you expect to return to your current living situation? Yes     Do you have help at home or someone to help you manage your care at home? Yes     Who are your caregiver(s) and their phone number(s)? Katharina López (Friend) [phone]     Prior to hospitilization cognitive status: Alert/Oriented     Current cognitive status: Alert/Oriented     Equipment Currently Used at Home none     Patient currently being followed by outpatient case management? No     Do you currently have service(s) that help you manage your care at home? No     Do you take prescription medications? Yes     Do you have prescription coverage? Yes     Do you have any problems affording any of your prescribed medications? No     Is the patient taking medications as prescribed? yes      Who is going to help you get home at discharge? Friend-Charles     How do you get to doctors appointments? family or friend will provide     Are you on dialysis? No     Do you take coumadin? No     Discharge Plan A Home     Discharge Plan B Home     DME Needed Upon Discharge  none                   Spoke with patient at bedside to complete d/c planning assessment. Patient lives alone in his motor home. 3 steps to enter. Patient will have a ride home per his friend charles when able to d/c. No needs noted.Will continue to follow.        Susana Suero RNCM  Case Management  [facility]  [phone]

## 2023-02-20 NOTE — SUBJECTIVE & OBJECTIVE
Interval History: NAEON. Afebrile. HDS. Reports feeling better this morning. Ambulating. Tolerating diet. Pain is controlled with current regimen. Adequate UOP. No new symptoms or concerns this morning. All questions answered.   WBC stable and wnl    Medications:  Continuous Infusions:  Scheduled Meds:   acetaminophen  650 mg Oral Q8H    enoxaparin  40 mg Subcutaneous Daily    gabapentin  300 mg Oral TID    methocarbamoL  500 mg Oral QID    piperacillin-tazobactam (ZOSYN) IVPB  4.5 g Intravenous Q8H    vancomycin (VANCOCIN) IVPB  1,500 mg Intravenous Q12H     PRN Meds:melatonin, ondansetron, oxyCODONE, sodium chloride 0.9%, Pharmacy to dose Vancomycin consult **AND** vancomycin - pharmacy to dose     Review of patient's allergies indicates:  No Known Allergies  Objective:     Vital Signs (Most Recent):  Temp: 96.5 °F (35.8 °C) (02/20/23 0452)  Pulse: 74 (02/20/23 0452)  Resp: 18 (02/20/23 0452)  BP: (!) 160/83 (02/20/23 0452)  SpO2: 98 % (02/20/23 0452)   Vital Signs (24h Range):  Temp:  [96.5 °F (35.8 °C)-98.3 °F (36.8 °C)] 96.5 °F (35.8 °C)  Pulse:  [67-74] 74  Resp:  [17-18] 18  SpO2:  [95 %-98 %] 98 %  BP: (128-160)/(63-83) 160/83     Weight: 74.8 kg (165 lb)  Body mass index is 23.01 kg/m².    Intake/Output - Last 3 Shifts         02/18 0700  02/19 0659 02/19 0700  02/20 0659    P.O. 860 1300    Total Intake(mL/kg) 860 (11.5) 1300 (17.4)    Urine (mL/kg/hr) 3050 (1.7) 2950 (1.6)    Stool  0    Total Output 3050 2950    Net -2190 -1650          Stool Occurrence  1 x            Physical Exam  Vitals and nursing note reviewed.   Constitutional:       General: He is not in acute distress.     Appearance: He is not ill-appearing or diaphoretic.      Comments: Room air   HENT:      Head: Normocephalic and atraumatic.      Mouth/Throat:      Mouth: Mucous membranes are moist.      Pharynx: Oropharynx is clear.   Eyes:      Extraocular Movements: Extraocular movements intact.      Conjunctiva/sclera: Conjunctivae  normal.      Pupils: Pupils are equal, round, and reactive to light.   Cardiovascular:      Rate and Rhythm: Normal rate.   Pulmonary:      Effort: Pulmonary effort is normal. No respiratory distress.   Abdominal:      Palpations: Abdomen is soft.   Musculoskeletal:         General: No deformity.      Comments: Right thigh laceration with improving erythema and swelling, appropriately TTP.  Neurovascularly intact proximally and distally    Skin:     General: Skin is warm and dry.      Coloration: Skin is not jaundiced.   Neurological:      General: No focal deficit present.      Mental Status: He is alert and oriented to person, place, and time.       Significant Labs:  I have reviewed all pertinent lab results within the past 24 hours.  CBC:   Recent Labs   Lab 02/20/23  0529   WBC 5.70   RBC 4.75   HGB 13.8*   HCT 43.3      MCV 91   MCH 29.1   MCHC 31.9*       BMP:   Recent Labs   Lab 02/18/23  0506   GLU 84      K 4.4      CO2 24   BUN 16   CREATININE 0.8   CALCIUM 9.0         Significant Diagnostics:  I have reviewed all pertinent imaging results/findings within the past 24 hours.

## 2023-02-20 NOTE — PROGRESS NOTES
[facility]  General Surgery  Progress Note    Subjective:     History of Present Illness:  No notes on file    Post-Op Info:  Procedure(s) (LRB):  Incision and Drainage (Right)   2 Days Post-Op     Interval History: NAEON. Afebrile. HDS. Reports feeling better this morning. Ambulating. Tolerating diet. Pain is controlled with current regimen. Adequate UOP. No new symptoms or concerns this morning. All questions answered.   WBC stable and wnl    Medications:  Continuous Infusions:  Scheduled Meds:   acetaminophen  650 mg Oral Q8H    enoxaparin  40 mg Subcutaneous Daily    gabapentin  300 mg Oral TID    methocarbamoL  500 mg Oral QID    piperacillin-tazobactam (ZOSYN) IVPB  4.5 g Intravenous Q8H    vancomycin (VANCOCIN) IVPB  1,500 mg Intravenous Q12H     PRN Meds:melatonin, ondansetron, oxyCODONE, sodium chloride 0.9%, Pharmacy to dose Vancomycin consult **AND** vancomycin - pharmacy to dose     Review of patient's allergies indicates:  No Known Allergies  Objective:     Vital Signs (Most Recent):  Temp: 96.5 °F (35.8 °C) (02/20/23 0452)  Pulse: 74 (02/20/23 0452)  Resp: 18 (02/20/23 0452)  BP: (!) 160/83 (02/20/23 0452)  SpO2: 98 % (02/20/23 0452)   Vital Signs (24h Range):  Temp:  [96.5 °F (35.8 °C)-98.3 °F (36.8 °C)] 96.5 °F (35.8 °C)  Pulse:  [67-74] 74  Resp:  [17-18] 18  SpO2:  [95 %-98 %] 98 %  BP: (128-160)/(63-83) 160/83     Weight: 74.8 kg (165 lb)  Body mass index is 23.01 kg/m².    Intake/Output - Last 3 Shifts         02/18 0700  02/19 0659 02/19 0700  02/20 0659    P.O. 860 1300    Total Intake(mL/kg) 860 (11.5) 1300 (17.4)    Urine (mL/kg/hr) 3050 (1.7) 2950 (1.6)    Stool  0    Total Output 3050 2950    Net -2190 -1650          Stool Occurrence  1 x            Physical Exam  Vitals and nursing note reviewed.   Constitutional:       General: He is not in acute distress.     Appearance: He is not ill-appearing or diaphoretic.      Comments: Room air   HENT:      Head: Normocephalic and  atraumatic.      Mouth/Throat:      Mouth: Mucous membranes are moist.      Pharynx: Oropharynx is clear.   Eyes:      Extraocular Movements: Extraocular movements intact.      Conjunctiva/sclera: Conjunctivae normal.      Pupils: Pupils are equal, round, and reactive to light.   Cardiovascular:      Rate and Rhythm: Normal rate.   Pulmonary:      Effort: Pulmonary effort is normal. No respiratory distress.   Abdominal:      Palpations: Abdomen is soft.   Musculoskeletal:         General: No deformity.      Comments: Right thigh laceration with improving erythema and swelling, appropriately TTP.  Neurovascularly intact proximally and distally    Skin:     General: Skin is warm and dry.      Coloration: Skin is not jaundiced.   Neurological:      General: No focal deficit present.      Mental Status: He is alert and oriented to person, place, and time.       Significant Labs:  I have reviewed all pertinent lab results within the past 24 hours.  CBC:   Recent Labs   Lab 02/20/23  0529   WBC 5.70   RBC 4.75   HGB 13.8*   HCT 43.3      MCV 91   MCH 29.1   MCHC 31.9*       BMP:   Recent Labs   Lab 02/18/23  0506   GLU 84      K 4.4      CO2 24   BUN 16   CREATININE 0.8   CALCIUM 9.0         Significant Diagnostics:  I have reviewed all pertinent imaging results/findings within the past 24 hours.    Assessment/Plan:     * Laceration of right thigh  Collins Ivy is a 72 y.o. presenting with laceration to the right thigh. Reports that he was using a circular saw and accidentally cut himself. He stitched closed with polyester sutured from [place] and then applied superglue to stop the bleeding. Now s/p washout on 2/18/2023. Clinically stable    - Regular diet  - Continue antibiotics. Will transition from IV to PO Augmentin today  - Scheduled multimodal pain regimen (tyleno, gabapentin, robaxin)  - PRN pain and nausea medications  - Daily labs  - Replete electrolytes PRN  - DVT prophylaxis (SCDs and  lovenox)  - OOB, ambulate    Dispo: approaching medical stability for dc    Case discussed with Dr. Gamble.         WILL TejedaC  General Surgery  [facility] - [facility]

## 2023-02-20 NOTE — PROGRESS NOTES
Therapy with vancomycin complete and/or consult discontinued by provider.  Pharmacy will sign off, please re-consult as needed.     Christiano Hartley, PharmD  Ext: [phone]

## 2023-02-20 NOTE — PLAN OF CARE
Problem: Adult Inpatient Plan of Care  Goal: Optimal Comfort and Wellbeing  Outcome: Ongoing, Progressing  Goal: Readiness for Transition of Care  Outcome: Ongoing, Progressing     Problem: Skin Injury Risk Increased  Goal: Skin Health and Integrity  Outcome: Ongoing, Progressing

## 2023-02-20 NOTE — ASSESSMENT & PLAN NOTE
Collins Ivy is a 72 y.o. presenting with laceration to the right thigh. Reports that he was using a circular saw and accidentally cut himself. He stitched closed with polyester sutured from [place] and then applied superglue to stop the bleeding. Now s/p washout on 2/18/2023. Clinically stable    - Regular diet  - Continue antibiotics. Will transition from IV to PO today  - Scheduled multimodal pain regimen (tyleno, gabapentin, robaxin)  - PRN pain and nausea medications  - Daily labs  - Replete electrolytes PRN  - DVT prophylaxis (SCDs and lovenox)  - OOB, ambulate    Dispo: approaching medical stability for dc

## 2023-02-21 VITALS
RESPIRATION RATE: 18 BRPM | BODY MASS INDEX: 23.01 KG/M2 | HEART RATE: 87 BPM | OXYGEN SATURATION: 98 % | WEIGHT: 165 LBS | SYSTOLIC BLOOD PRESSURE: 136 MMHG | TEMPERATURE: 98 F | DIASTOLIC BLOOD PRESSURE: 86 MMHG

## 2023-02-21 LAB
BASOPHILS # BLD AUTO: 0.03 K/UL (ref 0–0.2)
BASOPHILS NFR BLD: 0.6 % (ref 0–1.9)
DIFFERENTIAL METHOD: ABNORMAL
EOSINOPHIL # BLD AUTO: 0.3 K/UL (ref 0–0.5)
EOSINOPHIL NFR BLD: 4.6 % (ref 0–8)
ERYTHROCYTE [DISTWIDTH] IN BLOOD BY AUTOMATED COUNT: 12.4 % (ref 11.5–14.5)
HCT VFR BLD AUTO: 42.3 % (ref 40–54)
HGB BLD-MCNC: 13.9 G/DL (ref 14–18)
IMM GRANULOCYTES # BLD AUTO: 0.01 K/UL (ref 0–0.04)
IMM GRANULOCYTES NFR BLD AUTO: 0.2 % (ref 0–0.5)
LYMPHOCYTES # BLD AUTO: 0.7 K/UL (ref 1–4.8)
LYMPHOCYTES NFR BLD: 12.6 % (ref 18–48)
MCH RBC QN AUTO: 29 PG (ref 27–31)
MCHC RBC AUTO-ENTMCNC: 32.9 G/DL (ref 32–36)
MCV RBC AUTO: 88 FL (ref 82–98)
MONOCYTES # BLD AUTO: 0.5 K/UL (ref 0.3–1)
MONOCYTES NFR BLD: 9.3 % (ref 4–15)
NEUTROPHILS # BLD AUTO: 3.9 K/UL (ref 1.8–7.7)
NEUTROPHILS NFR BLD: 72.7 % (ref 38–73)
NRBC BLD-RTO: 0 /100 WBC
PLATELET # BLD AUTO: 280 K/UL (ref 150–450)
PMV BLD AUTO: 8.8 FL (ref 9.2–12.9)
RBC # BLD AUTO: 4.79 M/UL (ref 4.6–6.2)
WBC # BLD AUTO: 5.4 K/UL (ref 3.9–12.7)

## 2023-02-21 PROCEDURE — 25000003 PHARM REV CODE 250

## 2023-02-21 PROCEDURE — 25000003 PHARM REV CODE 250: Performed by: STUDENT IN AN ORGANIZED HEALTH CARE EDUCATION/TRAINING PROGRAM

## 2023-02-21 PROCEDURE — 94761 N-INVAS EAR/PLS OXIMETRY MLT: CPT

## 2023-02-21 PROCEDURE — 36415 COLL VENOUS BLD VENIPUNCTURE: CPT | Performed by: STUDENT IN AN ORGANIZED HEALTH CARE EDUCATION/TRAINING PROGRAM

## 2023-02-21 PROCEDURE — 85025 COMPLETE CBC W/AUTO DIFF WBC: CPT | Performed by: STUDENT IN AN ORGANIZED HEALTH CARE EDUCATION/TRAINING PROGRAM

## 2023-02-21 RX ORDER — GABAPENTIN 300 MG/1
300 CAPSULE ORAL 3 TIMES DAILY
Qty: 30 CAPSULE | Refills: 0 | Status: SHIPPED | OUTPATIENT
Start: 2023-02-21 | End: 2023-03-03

## 2023-02-21 RX ORDER — AMOXICILLIN AND CLAVULANATE POTASSIUM 875; 125 MG/1; MG/1
1 TABLET, FILM COATED ORAL EVERY 12 HOURS
Qty: 14 TABLET | Refills: 0 | Status: SHIPPED | OUTPATIENT
Start: 2023-02-21 | End: 2023-02-28

## 2023-02-21 RX ORDER — OXYCODONE HYDROCHLORIDE 5 MG/1
5 TABLET ORAL EVERY 6 HOURS PRN
Qty: 7 TABLET | Refills: 0 | Status: SHIPPED | OUTPATIENT
Start: 2023-02-21

## 2023-02-21 RX ORDER — ACETAMINOPHEN 325 MG/1
650 TABLET ORAL EVERY 8 HOURS
Refills: 0 | COMMUNITY
Start: 2023-02-21

## 2023-02-21 RX ADMIN — METHOCARBAMOL 500 MG: 500 TABLET ORAL at 09:02

## 2023-02-21 RX ADMIN — ACETAMINOPHEN 650 MG: 325 TABLET ORAL at 02:02

## 2023-02-21 RX ADMIN — AMOXICILLIN AND CLAVULANATE POTASSIUM 1 TABLET: 875; 125 TABLET, FILM COATED ORAL at 09:02

## 2023-02-21 RX ADMIN — OXYCODONE 5 MG: 5 TABLET ORAL at 04:02

## 2023-02-21 RX ADMIN — GABAPENTIN 300 MG: 300 CAPSULE ORAL at 02:02

## 2023-02-21 RX ADMIN — GABAPENTIN 300 MG: 300 CAPSULE ORAL at 09:02

## 2023-02-21 RX ADMIN — ACETAMINOPHEN 650 MG: 325 TABLET ORAL at 05:02

## 2023-02-21 RX ADMIN — METHOCARBAMOL 500 MG: 500 TABLET ORAL at 12:02

## 2023-02-21 NOTE — NURSING
Pt received discharge instructions. Verbalized understanding of all instructions. Prescriptions sent to patient's pharmacy. PIVs removed, no redness/swelling. Awaiting wheelchair for transport.

## 2023-02-21 NOTE — DISCHARGE SUMMARY
[facility] - Surgery  General Surgery  Discharge Summary      Patient Name: Collins Ivy  MRN: [id number]  Admission Date: 2/17/2023  Hospital Length of Stay: 2 days  Discharge Date and Time:  02/21/2023 7:11 AM  Attending Physician: Juan C Gamble MD   Discharging Provider: Jb Stanley MD  Primary Care Provider: Primary Doctor No     HPI:   71 yo male presenting with laceration to the right thigh that occurred yesterday. Reports that he was using a circular saw and accidentally cut himself. He stitched closed with polyester sutured from [place] and then applied superglue to stop the bleeding. This afternoon he had increased pain and muscle spasm so he consulted to the ED.      At the ED a XR and a CTA were done. There was no evidence of active contrast extravasation or large focal hematoma within the soft tissues involved with a superficial laceration at the level of the distal 1/3rd of the diaphysis of the femur just above the knee midline extending laterally.  The Quadriceps musculotendinous junction may also demonstrates some involvement with superficial fraying but grossly appears to be aligned/intact as imaged.     General surgery and orthopedics were consulted for evaluation.      Procedure(s) (LRB):  Incision and Drainage (Right)     Hospital Course:   Patient was admitted and started on IV abx, his wound was debrided and clean at bedside, supreglue and sutures were removed. Patient was treated with 48 hours of IV abx and then transitioned to PO augmentin. He was tolerating a diet, white count was normal and his pain was under controlled.     Consults:   Consults (From admission, onward)          Status Ordering Provider     Inpatient consult to Orthopedic Surgery  Once        Provider:  (Not yet assigned)    Completed JOVANY LARSEN     Inpatient consult to General Surgery  Once        Provider:  (Not yet assigned)    Completed ELLY JIMENEZ          Physical exam:   BP (!) 147/86   Pulse  68   Temp 97.5 °F (36.4 °C)   Resp 18   Wt 74.8 kg (165 lb)   SpO2 100%   BMI 23.01 kg/m²   Constitutional:       General: He is not in acute distress.     Appearance: He is not ill-appearing or diaphoretic.      Comments: Room air   HENT:      Head: Normocephalic and atraumatic.      Mouth/Throat:      Mouth: Mucous membranes are moist.      Pharynx: Oropharynx is clear.   Eyes:      Extraocular Movements: Extraocular movements intact.      Conjunctiva/sclera: Conjunctivae normal.      Pupils: Pupils are equal, round, and reactive to light.   Cardiovascular:      Rate and Rhythm: Normal rate.   Pulmonary:      Effort: Pulmonary effort is normal. No respiratory distress.   Abdominal:      Palpations: Abdomen is soft.   Musculoskeletal:         General: No deformity.      Comments: Right thigh laceration with improving erythema and swelling, appropriately TTP.  Neurovascularly intact proximally and distally    Skin:     General: Skin is warm and dry.      Coloration: Skin is not jaundiced.   Neurological:      General: No focal deficit present.      Mental Status: He is alert and oriented to person, place, and time.      Significant Diagnostic Studies: Labs: CMP No results for input(s): NA, K, CL, CO2, GLU, BUN, CREATININE, CALCIUM, PROT, ALBUMIN, BILITOT, ALKPHOS, AST, ALT, ANIONGAP, ESTGFRAFRICA, EGFRNONAA in the last 48 hours. and CBC   Recent Labs   Lab 02/20/23  0529 02/21/23  0620   WBC 5.70 5.40   HGB 13.8* 13.9*   HCT 43.3 42.3    280       Pending Diagnostic Studies:       None          Final Active Diagnoses:    Diagnosis Date Noted POA    PRINCIPAL PROBLEM:  Laceration of right thigh [S71.111A] 02/18/2023 Yes    Laceration of right lower extremity [S81.811A] 02/18/2023 Yes      Problems Resolved During this Admission:      Discharged Condition: good    Disposition: Home or Self Care    Follow Up:   Follow-up Information       Juan C Gamble MD Follow up in 1 week(s).    Specialties: General  Surgery, Surgery  Why: For wound re-check  Contact information:  [address]  [facility] [address]  [phone]                           Patient Instructions:      Notify your health care provider if you experience any of the following:  temperature >100.4     Notify your health care provider if you experience any of the following:  persistent nausea and vomiting or diarrhea     Notify your health care provider if you experience any of the following:  severe uncontrolled pain     Notify your health care provider if you experience any of the following:  redness, tenderness, or signs of infection (pain, swelling, redness, odor or green/yellow discharge around incision site)     Notify your health care provider if you experience any of the following:  difficulty breathing or increased cough     Notify your health care provider if you experience any of the following:  severe persistent headache     Notify your health care provider if you experience any of the following:  worsening rash     Notify your health care provider if you experience any of the following:  persistent dizziness, light-headedness, or visual disturbances     Notify your health care provider if you experience any of the following:  increased confusion or weakness     Notify your health care provider if you experience any of the following:     No driving until:   Scheduling Instructions: Off narcotics     Lifting restrictions     Medications:  Reconciled Home Medications:      Medication List        START taking these medications      acetaminophen 325 MG tablet  Commonly known as: TYLENOL  Take 2 tablets (650 mg total) by mouth every 8 (eight) hours.     amoxicillin-clavulanate 875-125mg 875-125 mg per tablet  Commonly known as: AUGMENTIN  Take 1 tablet by mouth every 12 (twelve) hours. for 7 days     gabapentin 300 MG capsule  Commonly known as: NEURONTIN  Take 1 capsule (300 mg total) by mouth 3 (three) times daily. for 10 days      oxyCODONE 5 MG immediate release tablet  Commonly known as: ROXICODONE  Take 1 tablet (5 mg total) by mouth every 6 (six) hours as needed.              Jb Stanley MD  General Surgery  [facility] - Surgery

## 2023-02-22 NOTE — PLAN OF CARE
[facility] - Surgery  Discharge Final Note    Primary Care Provider: Primary Doctor No    Expected Discharge Date: 2/21/2023    Final Discharge Note (most recent)       Final Note - 02/22/23 1015          Final Note    Assessment Type Final Discharge Note     Anticipated Discharge Disposition Home or Self Care     What phone number can be called within the next 1-3 days to see how you are doing after discharge? --   [phone]    Hospital Resources/Appts/Education Provided Appointments scheduled and added to AVS                     Important Message from Medicare             Contact Info       Juan C Gamble MD   Specialty: General Surgery, Surgery    [address]  [facility] [address]   Phone: [phone]       Next Steps: Follow up in 1 week(s)    Instructions: For wound re-check            Patient discharged home on 2/21/23.      Susana Suero RNCM  Case Management  [facility]  [phone]

## 2023-02-23 ENCOUNTER — PATIENT OUTREACH (OUTPATIENT)
Dept: ADMINISTRATIVE | Facility: CLINIC | Age: 73
End: 2023-02-23

## 2023-02-23 NOTE — PROGRESS NOTES
C3 nurse attempted to contact Collins Ivy for a TCC post hospital discharge follow up call. No answer. The patient does not have a scheduled HOSFU appointment, and the pt does not have an [facility] PCP.

## 2023-02-24 NOTE — PROGRESS NOTES
C3 nurse attempted to contact Collins Ivy for a TCC post hospital discharge follow up call. The patient is unable to conduct the call @ this time. The patient requested a callback.

## 2023-02-27 NOTE — PROGRESS NOTES
C3 nurse spoke with Collins Ivy for a TCC post hospital discharge follow up call. The patient reports does not have a scheduled HOSFU appointment. C3 nurse was unable to schedule [facility] appointment [facility]. Patient advised to contact a PCP to schedule a HOSPFU within 5-7 days.

## 2023-03-03 ENCOUNTER — TELEPHONE (OUTPATIENT)
Dept: SURGERY | Facility: CLINIC | Age: 73
End: 2023-03-03

## 2023-03-03 NOTE — TELEPHONE ENCOUNTER
----- Message from Alyssa Rosenbaum sent at 3/3/2023 10:54 AM CST -----  Regarding: Appt Scheduled  Contact: Patient  Patient is requesting a call back in reference to appt on 03/03/2023   Patient stated he got lost on his way to appt this morning but would still tonya to post op today if possible   Please Assist     Patient can be reached at [phone]

## 2023-03-06 ENCOUNTER — OFFICE VISIT (OUTPATIENT)
Dept: SURGERY | Facility: CLINIC | Age: 73
End: 2023-03-06

## 2023-03-06 VITALS — HEART RATE: 82 BPM | SYSTOLIC BLOOD PRESSURE: 131 MMHG | DIASTOLIC BLOOD PRESSURE: 100 MMHG

## 2023-03-06 DIAGNOSIS — S81.811A LACERATION OF RIGHT LOWER EXTREMITY, INITIAL ENCOUNTER: Primary | ICD-10-CM

## 2023-03-06 PROCEDURE — 99999 PR PBB SHADOW E&M-EST. PATIENT-LVL II: ICD-10-PCS | Mod: PBBFAC,,, | Performed by: SURGERY

## 2023-03-06 PROCEDURE — 99212 OFFICE O/P EST SF 10 MIN: CPT | Mod: S$GLB,,, | Performed by: SURGERY

## 2023-03-06 PROCEDURE — 99999 PR PBB SHADOW E&M-EST. PATIENT-LVL II: CPT | Mod: PBBFAC,,, | Performed by: SURGERY

## 2023-03-06 PROCEDURE — 99212 PR OFFICE/OUTPT VISIT, EST, LEVL II, 10-19 MIN: ICD-10-PCS | Mod: S$GLB,,, | Performed by: SURGERY

## 2023-03-06 RX ORDER — GABAPENTIN 300 MG/1
300 CAPSULE ORAL 3 TIMES DAILY
Qty: 90 CAPSULE | Refills: 11 | Status: SHIPPED | OUTPATIENT
Start: 2023-03-06 | End: 2024-03-05

## 2023-03-06 RX ORDER — DOXYCYCLINE 100 MG/1
100 CAPSULE ORAL EVERY 12 HOURS
Qty: 14 CAPSULE | Refills: 0 | Status: SHIPPED | OUTPATIENT
Start: 2023-03-06 | End: 2023-03-13

## 2023-03-06 NOTE — PROGRESS NOTES
Progress Note  General Surgery      SUBJECTIVE:   Collins Ivy is a  72 y.o.male who presents to clinic today for wound check. Patient reports not showering since discharge. He has kept the wound covered with dressing and coban. He denies any fever, chills, or recent illness. He reports improved pain over the laceration site. Pain has been much improved with gabapentin. Patient continues to perform strenuous work.      Review of patient's allergies indicates:  No Known Allergies    No past medical history on file.  Past Surgical History:   Procedure Laterality Date    INCISION AND DRAINAGE Right 2/18/2023    Procedure: Incision and Drainage;  Surgeon: Santiago Cuello MD;  Location: [facility] OR [address] [place] [address];  Service: General;  Laterality: Right;     No family history on file.  Social History     Tobacco Use    Smoking status: Unknown        Review of Systems:  ROS    OBJECTIVE:     Vitals:    03/06/23 1544   BP: (!) 131/100   Pulse: 82       Physical Exam:  Physical Exam  HENT:      Head: Normocephalic.      Mouth/Throat:      Mouth: Mucous membranes are moist.   Eyes:      Pupils: Pupils are equal, round, and reactive to light.   Cardiovascular:      Rate and Rhythm: Normal rate.      Pulses: Normal pulses.   Pulmonary:      Effort: Pulmonary effort is normal.   Abdominal:      General: Abdomen is flat.   Musculoskeletal:      Cervical back: Normal range of motion.      Comments: Reduced ROM   Skin:     General: Skin is warm.      Capillary Refill: Capillary refill takes less than 2 seconds.      Comments: R thigh laceration   Erythema and cellulitis   No fluctuance, crepitus, induration, or abscess   Laceration poor skin approximation    Neurological:      General: No focal deficit present.      Mental Status: He is alert.          Media Information    File Link        ASSESSMENT/PLAN:   Collins Ivy is a 72 y.o. male with healing wound concerning for cellulitis. Overall healing well, but will prescribe course of  antibiotics.       Plan  - 7 day course of doxy   - refilled gabapentin   - steri strips applied to better approximate skin edges   - 2 week wound check     Neetu Palmer MD  General Surgery   PGY-1

## 2023-03-25 NOTE — PROCEDURES
DATE OF PROCEDURE: 02/18/23    Surgeon(s) and Role:     * Santiago Cuello MD - Primary    PREOPERATIVE DIAGNOSES: R thigh laceration    POSTOPERATIVE DIAGNOSES:Same    PROCEDURE: R thigh suture removal    ANESTHESIA: None    DESCRIPTION OF PROCEDURE:   Patient's self placed sutures were removed from his laceration sustained via circular saw, allowing the wound to open and drain.  That was the extent of the procedure.    COMPLICATIONS: None.     BLOOD LOSS:2 mL.     FLUIDS: Per Anesthesia.     BLOOD GIVEN: None     DRAINS:  None    SPECIMENS: None    CONDITION OF THE PATIENT: Stable

## (undated) DEVICE — TRAY MINOR GEN SURG OMC

## (undated) DEVICE — APPLICATOR CHLORAPREP ORN 26ML

## (undated) DEVICE — ELECTRODE REM PLYHSV RETURN 9

## (undated) DEVICE — DRAPE LAP T SHT W/ INSTR PAD

## (undated) DEVICE — BLADE SURG CARBON STEEL SZ11

## (undated) DEVICE — ADHESIVE MASTISOL VIAL 48/BX

## (undated) DEVICE — GOWN POLY REINF BRTH SLV XL